# Patient Record
Sex: FEMALE | Race: BLACK OR AFRICAN AMERICAN
[De-identification: names, ages, dates, MRNs, and addresses within clinical notes are randomized per-mention and may not be internally consistent; named-entity substitution may affect disease eponyms.]

---

## 2019-08-28 ENCOUNTER — HOSPITAL ENCOUNTER (OUTPATIENT)
Dept: HOSPITAL 72 - CAT | Age: 84
Discharge: HOME | End: 2019-08-28
Payer: COMMERCIAL

## 2019-08-28 DIAGNOSIS — R10.9: Primary | ICD-10-CM

## 2019-08-28 DIAGNOSIS — K44.9: ICD-10-CM

## 2019-08-28 DIAGNOSIS — K57.90: ICD-10-CM

## 2019-08-28 PROCEDURE — 74176 CT ABD & PELVIS W/O CONTRAST: CPT

## 2019-08-28 NOTE — DIAGNOSTIC IMAGING REPORT
Indication: Left flank pain for 2 weeks

 

Technique: Spiral acquisitions obtained through the abdomen and pelvis. No oral or IV

contrast utilized, per urinary stone protocol. Multiplanar reconstructions were

generated.  Total dose length product 721.68 mGycm. CTDIvol(s)  13.68 mGy. Dose

reduction achieved using automated exposure control

 

 

Comparison:  none

 

Findings: No renal nor ureteral calculi, hydronephrosis, or hydroureter demonstrated.

Normal bladder. Lack of IV contrast limits assessment of the renal parenchyma. There

is a subcentimeter low-attenuation lesion in the anterior interpolar region of the

left kidney which is too small to characterize. Tiny exophytic lesion off the upper

pole of the right kidney is also too small to characterize.

 

Lack of IV contrast limits assessment of the other solid organs. The liver is

unremarkable. The gallbladder has been removed. There is dilatation of the

extrahepatic bile ducts, common bile duct measuring up to 13 mm in diameter. No

definite downstream obstructive lesion demonstrated, however. The pancreas, spleen,

adrenals are unremarkable. No retroperitoneal or mesenteric mass or adenopathy. A 2.3

cm cyst is seen adjacent to the upper portion of the uterus, probably adnexal in

origin. The uterus is somewhat prominent for age and somewhat lobulated, likely

reflecting old fibroids.

 

There is a small indirect left inguinal hernia which contains only fat. There are

colonic diverticula. No evidence of diverticulitis. The appendix is normal. No small

bowel distention. No free or loculated intraperitoneal gas or fluid is evident.

 

The included lung bases are clear. The bones demonstrate degenerative spondylosis

changes, are otherwise unremarkable.

 

Impression: No acute abnormality. No evidence of urinary stone disease or obstructive

uropathy

 

Status post cholecystectomy. Dilatation of the extra hepatic bile ducts, without

obvious downstream obstructive lesion, probably related to age and

postcholecystectomy state. However, downstream obstruction is not completely

excludable, and correlation with liver enzymes recommended, with consideration for

MRCP is clinically indicated

 

Colonic diverticulosis. No evidence of diverticulitis

 

Subcentimeter low-attenuation renal lesions, too small to characterize, most likely a

9 simple cortical cysts. No further follow-up necessary

 

Benign-appearing left adnexal cyst. No further follow-up necessary

 

Somewhat lobulated prominent uterus, suspect old fibroid

 

Other findings as noted, including degenerative spondylosis changes, small

fat-containing left inguinal hernia

 

This agrees with the preliminary interpretation provided overnight by [employer]

teleradiology service.

 

The CT scanner at [facility] is accredited by the [employer]

[employer]iology and the scans are performed using protocols designed to limit radiation

exposure to as low as reasonably achievable to attain images of sufficient resolution

adequate for diagnostic evaluation.